# Patient Record
Sex: MALE | Race: WHITE | NOT HISPANIC OR LATINO | Employment: OTHER | URBAN - METROPOLITAN AREA
[De-identification: names, ages, dates, MRNs, and addresses within clinical notes are randomized per-mention and may not be internally consistent; named-entity substitution may affect disease eponyms.]

---

## 2021-05-12 ENCOUNTER — MEDICAL CORRESPONDENCE (OUTPATIENT)
Dept: HEALTH INFORMATION MANAGEMENT | Facility: CLINIC | Age: 71
End: 2021-05-12

## 2023-02-22 ENCOUNTER — TELEPHONE (OUTPATIENT)
Dept: OPHTHALMOLOGY | Facility: CLINIC | Age: 73
End: 2023-02-22
Payer: COMMERCIAL

## 2023-02-22 ENCOUNTER — TRANSCRIBE ORDERS (OUTPATIENT)
Dept: OTHER | Age: 73
End: 2023-02-22

## 2023-02-22 DIAGNOSIS — H35.711: Primary | ICD-10-CM

## 2023-02-22 NOTE — TELEPHONE ENCOUNTER
Note to facilitator to assist on working on visudyne approval prior to scheduling PDT and assisting with scheduling.    Nuno Chaves RN 4:43 PM 02/22/23            [facility] Call Center    Phone Message    May a detailed message be left on voicemail: yes     Reason for Call: Appointment Intake    Referring Provider Name: Referred by: Dr. Jl Jacob @ [facility]  Phone: [phone], Fax: [phone]  Diagnosis and/or Symptoms:   Retina Clinic consult and testing. Provisional dx CSCR Right eye, PDT testing, Central serous chorioretinopathy,       Action Taken: Message routed to:  [facility]    Travel Screening: Not Applicable

## 2023-02-27 DIAGNOSIS — H35.711 CENTRAL SEROUS CHORIORETINOPATHY OF RIGHT EYE: Primary | ICD-10-CM

## 2023-02-27 NOTE — CONFIDENTIAL NOTE
Attempted to place order for verteporfin (Visudyne) per referral from Dr. Jacob for PDT treatment. Unable to complete order as associating dx per referring MD is not coverable with pt's insurance and would require an ABN to be signed by the pt.    VICENTA Mcknight 4:56 PM February 27, 2023

## 2023-02-28 DIAGNOSIS — H35.711 CENTRAL SEROUS CHORIORETINOPATHY OF RIGHT EYE: Primary | ICD-10-CM

## 2023-03-16 ENCOUNTER — OFFICE VISIT (OUTPATIENT)
Dept: OPHTHALMOLOGY | Facility: CLINIC | Age: 73
End: 2023-03-16
Attending: OPHTHALMOLOGY
Payer: COMMERCIAL

## 2023-03-16 DIAGNOSIS — H35.711 CENTRAL SEROUS CHORIORETINOPATHY OF RIGHT EYE: ICD-10-CM

## 2023-03-16 DIAGNOSIS — H35.711: ICD-10-CM

## 2023-03-16 PROCEDURE — 99207 FUNDUS AUTOFLUORESCENCE IMAGE (FAF) OU (BOTH EYES): CPT | Mod: 26 | Performed by: OPHTHALMOLOGY

## 2023-03-16 PROCEDURE — 92134 CPTRZ OPH DX IMG PST SGM RTA: CPT | Performed by: OPHTHALMOLOGY

## 2023-03-16 PROCEDURE — 99204 OFFICE O/P NEW MOD 45 MIN: CPT | Performed by: OPHTHALMOLOGY

## 2023-03-16 PROCEDURE — 92242 FLUORESCEIN&ICG ANGIOGRAPHY: CPT | Performed by: OPHTHALMOLOGY

## 2023-03-16 PROCEDURE — G0463 HOSPITAL OUTPT CLINIC VISIT: HCPCS | Mod: 25 | Performed by: OPHTHALMOLOGY

## 2023-03-16 PROCEDURE — 92250 FUNDUS PHOTOGRAPHY W/I&R: CPT | Performed by: OPHTHALMOLOGY

## 2023-03-16 RX ORDER — EPLERENONE 25 MG/1
25 TABLET, FILM COATED ORAL DAILY
Qty: 30 TABLET | Refills: 1 | Status: SHIPPED | OUTPATIENT
Start: 2023-03-16 | End: 2023-05-07

## 2023-03-16 ASSESSMENT — TONOMETRY
IOP_METHOD: TONOPEN
OS_IOP_MMHG: 16
OD_IOP_MMHG: 13

## 2023-03-16 ASSESSMENT — CONF VISUAL FIELD
OD_SUPERIOR_NASAL_RESTRICTION: 0
OD_NORMAL: 1
OD_SUPERIOR_TEMPORAL_RESTRICTION: 0
OS_NORMAL: 1
OS_SUPERIOR_NASAL_RESTRICTION: 0
OS_SUPERIOR_TEMPORAL_RESTRICTION: 0
OD_INFERIOR_TEMPORAL_RESTRICTION: 0
OD_INFERIOR_NASAL_RESTRICTION: 0
OS_INFERIOR_TEMPORAL_RESTRICTION: 0
OS_INFERIOR_NASAL_RESTRICTION: 0

## 2023-03-16 ASSESSMENT — EXTERNAL EXAM - LEFT EYE: OS_EXAM: NORMAL

## 2023-03-16 ASSESSMENT — EXTERNAL EXAM - RIGHT EYE: OD_EXAM: NORMAL

## 2023-03-16 ASSESSMENT — VISUAL ACUITY
OS_SC+: +2
OS_SC: 20/25
OD_SC: 20/40
METHOD: SNELLEN - LINEAR

## 2023-03-16 ASSESSMENT — SLIT LAMP EXAM - LIDS
COMMENTS: NORMAL
COMMENTS: NORMAL

## 2023-03-16 NOTE — LETTER
3/16/2023       RE: Tino Hunt  [address]  [place] [place] [facility] [address]     Dear Colleague,    Thank you for referring your patient, Tino Hunt, to the [facility] - [place] at [facility]. Please see a copy of my visit note below.    I have confirmed the patient's and reviewed Past Medical History, Past Surgical History, Social History, Family History, Problem List, Medication List and agree with Tech note.    CC: referal for Central serous retinopathy right eye     HPI: patient has had Central serous retinopathy for 6 months and no resolution with observation    Imaging:  Right eye- subretinal fluid with no intraretinal fluid; PVD  Left eye- normal contour, trace epiretinal membrane with no intraretinal fluid or subretinal fluid    Fluorescein angiography/ICG   Right eye transit-staining with staining at fovea, no choroidal neovascularization or polyps noted on 12 min late frames   Left eye no leakage with either FA or ICG    Assessment/plan:   1.  Central serous retinopathy right eye    Fluorescein angiography/icg transits right eye  To rule out polypoidal choroidal vasculopathy shows miled staining, no choroidal neovascularization or polyps    OCT and fundus autofluorescence both eyes    Plan: start inspira 25 mg daily       RTC 8 weeks for Exam/OCT to monitor effectivenes of inspira    Mazin Underwood MD PhD.  Professor & Chair

## 2023-03-16 NOTE — PROGRESS NOTES
I have confirmed the patient's and reviewed Past Medical History, Past Surgical History, Social History, Family History, Problem List, Medication List and agree with Tech note.    CC: referal for Central serous retinopathy right eye     HPI: patient has had Central serous retinopathy for 6 months and no resolution with observation    Imaging:  Right eye- subretinal fluid with no intraretinal fluid; PVD  Left eye- normal contour, trace epiretinal membrane with no intraretinal fluid or subretinal fluid    Fluorescein angiography/ICG   Right eye transit-staining with staining at fovea, no choroidal neovascularization or polyps noted on 12 min late frames   Left eye no leakage with either FA or ICG    Assessment/plan:   1.  Central serous retinopathy right eye    Fluorescein angiography/icg transits right eye  To rule out polypoidal choroidal vasculopathy shows miled staining, no choroidal neovascularization or polyps    OCT and fundus autofluorescence both eyes    Plan: start inspira 25 mg daily       RTC 8 weeks for Exam/OCT to monitor effectivenes of inspira    Mazin Underwood MD PhD.  Professor & Chair

## 2023-03-16 NOTE — NURSING NOTE
Chief Complaints and History of Present Illnesses   Patient presents with     Central Serous Retinopathy Evaluation     Referred by Omar Dajani/[facility] RE haze spot in vision x 6 months  Kristyn YADAV 8:48 AM March 16, 2023        Chief Complaint(s) and History of Present Illness(es)     Central Serous Retinopathy Evaluation            Laterality: right eye    Location: central vision    Quality: hazy    Associated symptoms: floaters (LE).  Negative for eye pain, photophobia and flashes    Treatments tried: no treatments    Pain scale: 0/10    Comments: Referred by Omar Dajani/[facility] RE haze spot in vision x 6 months  Kristyn YADAV 8:48 AM March 16, 2023

## 2023-04-08 DIAGNOSIS — H35.711 CENTRAL SEROUS CHORIORETINOPATHY OF RIGHT EYE: ICD-10-CM

## 2023-04-12 RX ORDER — EPLERENONE 25 MG/1
TABLET, FILM COATED ORAL
Qty: 30 TABLET | Refills: 1 | OUTPATIENT
Start: 2023-04-12

## 2023-04-26 DIAGNOSIS — H35.711 CENTRAL SEROUS CHORIORETINOPATHY OF RIGHT EYE: Primary | ICD-10-CM

## 2023-05-05 DIAGNOSIS — H35.711 CENTRAL SEROUS CHORIORETINOPATHY OF RIGHT EYE: ICD-10-CM

## 2023-05-06 NOTE — TELEPHONE ENCOUNTER
eplerenone (INSPRA) 25 MG tablet   Last Written Prescription Date: 3/16/23  Last Fill Quantity: 30,   # refills: 1  Last Office Visit : 3/16/23  Future Office visit:  5/11/23    Routing refill request to provider for review/approval because: end date 4/15/23. Not on protocol

## 2023-05-07 RX ORDER — EPLERENONE 25 MG/1
TABLET, FILM COATED ORAL
Qty: 30 TABLET | Refills: 1 | Status: SHIPPED | OUTPATIENT
Start: 2023-05-07 | End: 2023-05-11

## 2023-05-11 ENCOUNTER — OFFICE VISIT (OUTPATIENT)
Dept: OPHTHALMOLOGY | Facility: CLINIC | Age: 73
End: 2023-05-11
Attending: OPHTHALMOLOGY
Payer: COMMERCIAL

## 2023-05-11 DIAGNOSIS — H35.711 CENTRAL SEROUS CHORIORETINOPATHY OF RIGHT EYE: ICD-10-CM

## 2023-05-11 PROCEDURE — 99213 OFFICE O/P EST LOW 20 MIN: CPT | Performed by: OPHTHALMOLOGY

## 2023-05-11 PROCEDURE — 92134 CPTRZ OPH DX IMG PST SGM RTA: CPT | Performed by: OPHTHALMOLOGY

## 2023-05-11 PROCEDURE — G0463 HOSPITAL OUTPT CLINIC VISIT: HCPCS | Performed by: OPHTHALMOLOGY

## 2023-05-11 RX ORDER — LOSARTAN POTASSIUM 25 MG/1
TABLET ORAL
COMMUNITY
Start: 2022-09-28 | End: 2023-07-17

## 2023-05-11 RX ORDER — NAPROXEN 500 MG/1
TABLET ORAL
COMMUNITY
Start: 2022-07-19 | End: 2023-07-17

## 2023-05-11 RX ORDER — BROMFENAC SODIUM 0.7 MG/ML
SOLUTION/ DROPS OPHTHALMIC
COMMUNITY
Start: 2022-08-17 | End: 2023-07-17

## 2023-05-11 RX ORDER — LORAZEPAM 1 MG/1
1 TABLET ORAL
COMMUNITY
Start: 2009-03-03

## 2023-05-11 RX ORDER — TAMSULOSIN HYDROCHLORIDE 0.4 MG/1
0.4 CAPSULE ORAL DAILY
COMMUNITY
Start: 2022-10-03 | End: 2023-11-08

## 2023-05-11 RX ORDER — EPLERENONE 25 MG/1
25 TABLET, FILM COATED ORAL DAILY
Qty: 30 TABLET | Refills: 3 | Status: SHIPPED | OUTPATIENT
Start: 2023-05-11 | End: 2023-07-17

## 2023-05-11 RX ORDER — AMLODIPINE BESYLATE 5 MG/1
1 TABLET ORAL
COMMUNITY
Start: 2023-01-27

## 2023-05-11 ASSESSMENT — CONF VISUAL FIELD
OD_SUPERIOR_NASAL_RESTRICTION: 0
OS_SUPERIOR_NASAL_RESTRICTION: 0
OD_NORMAL: 1
OS_INFERIOR_TEMPORAL_RESTRICTION: 0
OD_INFERIOR_TEMPORAL_RESTRICTION: 0
OD_INFERIOR_NASAL_RESTRICTION: 0
OS_INFERIOR_NASAL_RESTRICTION: 0
OS_SUPERIOR_TEMPORAL_RESTRICTION: 0
OS_NORMAL: 1
OD_SUPERIOR_TEMPORAL_RESTRICTION: 0

## 2023-05-11 ASSESSMENT — EXTERNAL EXAM - RIGHT EYE: OD_EXAM: NORMAL

## 2023-05-11 ASSESSMENT — VISUAL ACUITY
METHOD: SNELLEN - LINEAR
OS_SC: 20/25
OD_SC: 20/40

## 2023-05-11 ASSESSMENT — EXTERNAL EXAM - LEFT EYE: OS_EXAM: NORMAL

## 2023-05-11 ASSESSMENT — SLIT LAMP EXAM - LIDS
COMMENTS: NORMAL
COMMENTS: NORMAL

## 2023-05-11 ASSESSMENT — TONOMETRY
IOP_METHOD: TONOPEN
OS_IOP_MMHG: 17
OD_IOP_MMHG: 20

## 2023-05-11 NOTE — PROGRESS NOTES
I have confirmed the patient's and reviewed Past Medical History, Past Surgical History, Social History, Family History, Problem List, Medication List and agree with Tech note.    CC: referal for Central serous retinopathy right eye     HPI: patient has had Central serous retinopathy for 6 months and no resolution with observation and now on eplenerone for 2 months and subjectively the same.    Imaging:  Right eye- reduced subretinal fluid with no intraretinal fluid; PVD  Left eye- normal contour, trace epiretinal membrane with no intraretinal fluid or subretinal fluid    Fluorescein angiography/ICG   Right eye transit-staining with staining at fovea, no choroidal neovascularization or polyps noted on 12 min late frames   Left eye no leakage with either FA or ICG    Assessment/plan:   1.  Central serous retinopathy right eye    Fluorescein angiography/icg transits right eye  To rule out polypoidal choroidal vasculopathy shows miled staining, no choroidal neovascularization or polyps    OCT and fundus autofluorescence both eyes    Eat banana daily to avert low potassium.   K+ level with primary care physician    Plan: continue inspira 25 mg daily       RTC 10 weeks for Exam/OCT to monitor effectivenes of inspira    Mazin Underwood MD PhD.  Professor & Chair

## 2023-05-11 NOTE — NURSING NOTE
Chief Complaints and History of Present Illnesses   Patient presents with     Central Serous Retinopathy Follow Up     Chief Complaint(s) and History of Present Illness(es)     Central Serous Retinopathy Follow Up            Laterality: right eye    Onset: 8 weeks ago          Comments    Pt. States that VA has been stable. Not any better or worse. Has not missed any doses of medication. No pain BE.   Vijaya Tyson COT 7:45 AM May 11, 2023

## 2023-05-11 NOTE — LETTER
5/11/2023       RE: Tino Hunt  [address]  [place] [address]     Dear Colleague,    Thank you for referring your patient, Tino Hunt, to the [facility] - [place] at [facility]. Please see a copy of my visit note below.    I have confirmed the patient's and reviewed Past Medical History, Past Surgical History, Social History, Family History, Problem List, Medication List and agree with Tech note.    CC: referal for Central serous retinopathy right eye     HPI: patient has had Central serous retinopathy for 6 months and no resolution with observation and now on eplenerone for 2 months and subjectively the same.    Imaging:  Right eye- reduced subretinal fluid with no intraretinal fluid; PVD  Left eye- normal contour, trace epiretinal membrane with no intraretinal fluid or subretinal fluid    Fluorescein angiography/ICG   Right eye transit-staining with staining at fovea, no choroidal neovascularization or polyps noted on 12 min late frames   Left eye no leakage with either FA or ICG    Assessment/plan:   1.  Central serous retinopathy right eye    Fluorescein angiography/icg transits right eye  To rule out polypoidal choroidal vasculopathy shows miled staining, no choroidal neovascularization or polyps    OCT and fundus autofluorescence both eyes    Eat banana daily to avert low potassium.   K+ level with primary care physician    Plan: continue inspira 25 mg daily       RTC 10 weeks for Exam/OCT to monitor effectivenes of inspira    Mazin Quarles MD PhD.  Professor & Chair        Again, thank you for allowing me to participate in the care of your patient.      Sincerely,    Mazin Quarles MD

## 2023-05-21 ENCOUNTER — HEALTH MAINTENANCE LETTER (OUTPATIENT)
Age: 73
End: 2023-05-21

## 2023-07-06 DIAGNOSIS — H35.711 CENTRAL SEROUS CHORIORETINOPATHY OF RIGHT EYE: Primary | ICD-10-CM

## 2023-07-17 ENCOUNTER — MYC MEDICAL ADVICE (OUTPATIENT)
Dept: OPHTHALMOLOGY | Facility: CLINIC | Age: 73
End: 2023-07-17

## 2023-07-17 ENCOUNTER — OFFICE VISIT (OUTPATIENT)
Dept: OPHTHALMOLOGY | Facility: CLINIC | Age: 73
End: 2023-07-17
Attending: OPHTHALMOLOGY
Payer: COMMERCIAL

## 2023-07-17 DIAGNOSIS — H35.711 CENTRAL SEROUS CHORIORETINOPATHY OF RIGHT EYE: ICD-10-CM

## 2023-07-17 PROCEDURE — 99213 OFFICE O/P EST LOW 20 MIN: CPT | Mod: GC | Performed by: OPHTHALMOLOGY

## 2023-07-17 PROCEDURE — 92134 CPTRZ OPH DX IMG PST SGM RTA: CPT | Performed by: OPHTHALMOLOGY

## 2023-07-17 PROCEDURE — G0463 HOSPITAL OUTPT CLINIC VISIT: HCPCS | Performed by: OPHTHALMOLOGY

## 2023-07-17 RX ORDER — EPLERENONE 25 MG/1
25 TABLET, FILM COATED ORAL DAILY
Qty: 30 TABLET | Refills: 3 | Status: SHIPPED | OUTPATIENT
Start: 2023-07-17 | End: 2023-10-09

## 2023-07-17 ASSESSMENT — VISUAL ACUITY
OS_SC+: -1
METHOD: SNELLEN - LINEAR
OD_SC: 20/30
OD_SC+: +2
OS_SC: 20/25

## 2023-07-17 ASSESSMENT — TONOMETRY
IOP_METHOD: TONOPEN
OD_IOP_MMHG: 13
OS_IOP_MMHG: 13

## 2023-07-17 ASSESSMENT — CONF VISUAL FIELD
OD_SUPERIOR_NASAL_RESTRICTION: 0
OD_SUPERIOR_TEMPORAL_RESTRICTION: 0
OD_INFERIOR_NASAL_RESTRICTION: 0
OS_INFERIOR_TEMPORAL_RESTRICTION: 0
OS_SUPERIOR_NASAL_RESTRICTION: 0
OS_NORMAL: 1
OD_INFERIOR_TEMPORAL_RESTRICTION: 0
METHOD: COUNTING FINGERS
OD_NORMAL: 1
OS_SUPERIOR_TEMPORAL_RESTRICTION: 0
OS_INFERIOR_NASAL_RESTRICTION: 0

## 2023-07-17 ASSESSMENT — EXTERNAL EXAM - RIGHT EYE: OD_EXAM: NORMAL

## 2023-07-17 ASSESSMENT — EXTERNAL EXAM - LEFT EYE: OS_EXAM: NORMAL

## 2023-07-17 ASSESSMENT — SLIT LAMP EXAM - LIDS
COMMENTS: NORMAL
COMMENTS: NORMAL

## 2023-07-17 NOTE — PROGRESS NOTES
I have confirmed the patient's and reviewed Past Medical History, Past Surgical History, Social History, Family History, Problem List, Medication List and agree with Tech note.    CC: referal for Central serous retinopathy right eye     HPI: patient has had Central serous retinopathy for 6 months and no resolution with observation and now on eplenerone for 4 months without improvement    Imaging:  OCT mac 07/17/23  Right eye- Increased subretinal fluid with no intraretinal fluid; PVD  Left eye- normal contour, trace epiretinal membrane with no intraretinal fluid or subretinal fluid    Fluorescein angiography/ICG   Right eye transit-staining with staining at fovea, no choroidal neovascularization or polyps noted on 12 min late frames   Left eye no leakage with either FA or ICG    Assessment/plan:   1.  Central serous retinopathy right eye    Fluorescein angiography/icg transits right eye  To rule out polypoidal choroidal vasculopathy shows miled staining, no choroidal neovascularization or polyps    OCT and fundus autofluorescence both eyes    K was 4.4 in June    Eat banana daily to avert low potassium.   Plan: stop inspira      Return in about 3 months (around 10/17/2023) for , Exam & OCT OU.      Thank you for entrusting us with your care  Mechelle To MD, PGY3  Ophthalmology Resident  [facility]    Attestation:  I have seen and examined the patient with Dr. Radha To MD and agree with the findings in this note, as well as the interpretations of the diagnostic tests.      Mazin Underwood MD PhD.  Professor & Chair

## 2023-07-17 NOTE — NURSING NOTE
Chief Complaints and History of Present Illnesses   Patient presents with     Follow Up     Central serous chorioretinopathy of right eye     Chief Complaint(s) and History of Present Illness(es)     Follow Up            Comments: Central serous chorioretinopathy of right eye          Comments    Pt states no change in VA since last visit  Pt states floaters in left eye for years  States no flashes, eye pain or redness    Monique Khan COT 12:34 PM July 17, 2023

## 2023-07-21 NOTE — TELEPHONE ENCOUNTER
Called and left VM to stop Eplerenone as was indicated in Dr. AGUILAR's last note. I told the patient to call with any questions.

## 2023-09-28 DIAGNOSIS — H35.711 CENTRAL SEROUS CHORIORETINOPATHY OF RIGHT EYE: Primary | ICD-10-CM

## 2023-10-09 ENCOUNTER — OFFICE VISIT (OUTPATIENT)
Dept: OPHTHALMOLOGY | Facility: CLINIC | Age: 73
End: 2023-10-09
Attending: OPHTHALMOLOGY
Payer: COMMERCIAL

## 2023-10-09 DIAGNOSIS — H35.711 CENTRAL SEROUS CHORIORETINOPATHY OF RIGHT EYE: ICD-10-CM

## 2023-10-09 PROCEDURE — 92134 CPTRZ OPH DX IMG PST SGM RTA: CPT | Performed by: OPHTHALMOLOGY

## 2023-10-09 PROCEDURE — 99213 OFFICE O/P EST LOW 20 MIN: CPT | Mod: GC | Performed by: OPHTHALMOLOGY

## 2023-10-09 PROCEDURE — G0463 HOSPITAL OUTPT CLINIC VISIT: HCPCS | Performed by: OPHTHALMOLOGY

## 2023-10-09 ASSESSMENT — VISUAL ACUITY
OD_SC+: -2
OS_SC+: -2
METHOD: SNELLEN - LINEAR
OD_SC: 20/40
OS_SC: 20/25

## 2023-10-09 ASSESSMENT — CONF VISUAL FIELD
OS_INFERIOR_TEMPORAL_RESTRICTION: 0
OS_NORMAL: 1
OD_SUPERIOR_TEMPORAL_RESTRICTION: 0
OD_INFERIOR_TEMPORAL_RESTRICTION: 0
OD_INFERIOR_NASAL_RESTRICTION: 0
OD_NORMAL: 1
METHOD: COUNTING FINGERS
OS_SUPERIOR_NASAL_RESTRICTION: 0
OS_SUPERIOR_TEMPORAL_RESTRICTION: 0
OS_INFERIOR_NASAL_RESTRICTION: 0
OD_SUPERIOR_NASAL_RESTRICTION: 0

## 2023-10-09 ASSESSMENT — EXTERNAL EXAM - LEFT EYE: OS_EXAM: NORMAL

## 2023-10-09 ASSESSMENT — EXTERNAL EXAM - RIGHT EYE: OD_EXAM: NORMAL

## 2023-10-09 ASSESSMENT — SLIT LAMP EXAM - LIDS
COMMENTS: NORMAL
COMMENTS: NORMAL

## 2023-10-09 ASSESSMENT — TONOMETRY
OD_IOP_MMHG: 20
OS_IOP_MMHG: 19
IOP_METHOD: TONOPEN

## 2023-10-09 NOTE — PROGRESS NOTES
I have confirmed the patient's and reviewed Past Medical History, Past Surgical History, Social History, Family History, Problem List, Medication List and agree with Tech note.    CC: referal for Central serous retinopathy right eye     Interval: Tino is here to continue care for central serous chorioretinopathy of RE. Today he states  no vision change from last visit.     HPI: patient has had Central serous retinopathy for 6 months and no resolution with observation and now on eplenerone for 4 months without improvement    Imaging:  OCT mac 07/17/23  Right eye- Increased subretinal fluid with no intraretinal fluid; PVD  Left eye- normal contour, trace epiretinal membrane with no intraretinal fluid or subretinal fluid    Fluorescein angiography/ICG   Right eye transit-staining with staining at fovea, no choroidal neovascularization or polyps noted on 12 min late frames   Left eye no leakage with either FA or ICG    Assessment/plan:   1.  Central serous retinopathy right eye    Fluorescein angiography/icg transits right eye  To rule out polypoidal choroidal vasculopathy shows miled staining, no choroidal neovascularization or polyps    OCT and fundus autofluorescence both eyes    K was 4.4 in June    Eat banana daily to avert low potassium.   Plan: Continue monitoring off inspira      Return in about 4 months (around 2/9/2024) for DFE OD only, OCT macula.      Abdifatah Payne MD MPH  Vitreoretinal Fellow PGY-6  [facility]     Attestation:  I have seen and examined the patient with Dr. Abdifatah Payne MD, MPH and agree with the findings in this note, as well as the interpretations of the diagnostic tests.      Mazin Underwood MD PhD.  Professor & Chair

## 2023-10-09 NOTE — NURSING NOTE
Chief Complaints and History of Present Illnesses   Patient presents with    Central Serous Retinopathy Follow Up     Chief Complaint(s) and History of Present Illness(es)       Central Serous Retinopathy Follow Up              Laterality: right eye    Associated symptoms: floaters (LE).  Negative for dryness, eye pain, tearing, flashes, itching and burning    Pain scale: 0/10              Comments    Tino is here to continue care for central serous chorioretinopathy of RE. Today he states  no vision change from last visit.     Tino Carrillo COT 1:44 PM October 9, 2023

## 2023-12-31 ENCOUNTER — HEALTH MAINTENANCE LETTER (OUTPATIENT)
Age: 73
End: 2023-12-31

## 2024-01-10 DIAGNOSIS — H35.711 CENTRAL SEROUS CHORIORETINOPATHY OF RIGHT EYE: Primary | ICD-10-CM

## 2024-01-18 ENCOUNTER — OFFICE VISIT (OUTPATIENT)
Dept: OPHTHALMOLOGY | Facility: CLINIC | Age: 74
End: 2024-01-18
Attending: OPHTHALMOLOGY
Payer: COMMERCIAL

## 2024-01-18 DIAGNOSIS — H35.711 CENTRAL SEROUS CHORIORETINOPATHY OF RIGHT EYE: ICD-10-CM

## 2024-01-18 PROCEDURE — 92134 CPTRZ OPH DX IMG PST SGM RTA: CPT | Performed by: OPHTHALMOLOGY

## 2024-01-18 PROCEDURE — G0463 HOSPITAL OUTPT CLINIC VISIT: HCPCS | Performed by: OPHTHALMOLOGY

## 2024-01-18 PROCEDURE — 99214 OFFICE O/P EST MOD 30 MIN: CPT | Mod: GC | Performed by: OPHTHALMOLOGY

## 2024-01-18 ASSESSMENT — VISUAL ACUITY
OD_SC+: -1
OD_SC: 20/60
OD_PH_SC+: -2
OD_PH_SC: 20/40
OS_SC: 20/20
OS_SC+: -3
METHOD: SNELLEN - LINEAR

## 2024-01-18 ASSESSMENT — CONF VISUAL FIELD
OS_SUPERIOR_NASAL_RESTRICTION: 0
OS_INFERIOR_NASAL_RESTRICTION: 0
OD_INFERIOR_TEMPORAL_RESTRICTION: 0
OD_SUPERIOR_NASAL_RESTRICTION: 0
OD_INFERIOR_NASAL_RESTRICTION: 0
OD_SUPERIOR_TEMPORAL_RESTRICTION: 0
OS_INFERIOR_TEMPORAL_RESTRICTION: 0
OS_SUPERIOR_TEMPORAL_RESTRICTION: 0

## 2024-01-18 ASSESSMENT — SLIT LAMP EXAM - LIDS
COMMENTS: NORMAL
COMMENTS: NORMAL

## 2024-01-18 ASSESSMENT — TONOMETRY
OS_IOP_MMHG: 18
IOP_METHOD: TONOPEN
OD_IOP_MMHG: 15

## 2024-01-18 ASSESSMENT — EXTERNAL EXAM - LEFT EYE: OS_EXAM: NORMAL

## 2024-01-18 ASSESSMENT — EXTERNAL EXAM - RIGHT EYE: OD_EXAM: NORMAL

## 2024-01-18 NOTE — PROGRESS NOTES
I have confirmed the patient's and reviewed Past Medical History, Past Surgical History, Social History, Family History, Problem List, Medication List and agree with Tech note.    CC: follow up Central serous retinopathy right eye     Interval: Tino is here to continue care for central serous chorioretinopathy of RE. Today he states his vision and distortion have gotten significantly worse since his last visit.    HPI: patient has had Central serous retinopathy for 6 months and no resolution with observation and now on eplenerone for 4 months without improvement    Imaging:  OCT mac 1/18/24  Right eye- Improved subretinal fluid over fovea, some remaining SRF superiorly, no intraretinal fluid; PVD  Left eye- normal contour, trace epiretinal membrane with no intraretinal fluid or subretinal fluid    Fluorescein angiography/ICG   Right eye transit-staining with staining at fovea, no choroidal neovascularization or polyps noted on 12 min late frames   Left eye no leakage with either FA or ICG    Assessment/plan:   1.  Central serous retinopathy right eye    Fluorescein angiography/icg transits right eye  To rule out polypoidal choroidal vasculopathy shows miled staining, no choroidal neovascularization or polyps   K was 4.4 in June    Eat banana daily to avert low potassium.  Previously tried eplerenone with minimal improvement     OCT with significantly improved SRF today 1/18/24   Subjectively worse distortion despite improvement in fluid   Continue to monitor  2. Pseudophakia Both eyes    stable    Return in about 3 months (around 4/18/2024) for , Exam & OCT OU, FAF OU.      Elvia Sarmiento MD  Resident Physician, PGY-3  Department of Ophthalmology      Attestation:  I have seen and examined the patient with Dr. Sarmiento and agree with the findings in this note, as well as the interpretations of the diagnostic tests.      Mazin Underwood MD PhD.  Professor & Chair

## 2024-01-18 NOTE — NURSING NOTE
Chief Complaints and History of Present Illnesses   Patient presents with    Decreased Vision Right Eye     Chief Complaint(s) and History of Present Illness(es)       Decreased Vision Right Eye              Laterality: right eye    Associated symptoms: floaters.  Negative for eye pain and flashes    Treatments tried: artificial tears              Comments    Here for decreased vision right eye. VA in the eye is about the same. Stable floaters without flashes. Uses AT as needed.    Blane JOHNSON 9:08 AM January 18, 2024

## 2024-03-27 NOTE — PROGRESS NOTES
CC: central serous chorioretinopathy right eye     HPI: Mr Hunt is seen today for follow up on central serous chorioretinopathy right eye.     PMHx:   NA      Imaging:    OCT: 03/28/2024  Right eye: improved SRF superiorly, still persistent inferiorly  Left eye: Good foveal contour, no IRF/SRF     Retina Laser procedures:  none    Intravitreal injections:  none    Assessment/ Plan: 03/28/2024       # Central serous retinopathy right eye   -Fluorescein angiography/icg transits right eye done previously to rule out polypoidal choroidal vasculopathy shows mild staining, no choroidal neovascularization or polyps   - Previously tried eplerenone with minimal improvement  - OCT with significantly improved SRF today 1/18/24  - Subjectively worse distortion despite improvement in fluid  - Continue to monitor  - follow up in 4 months    # Optic disc cupping both eyes   - baseline RNFL     # Pseudophakia Both eyes   -stable     Return in about 4 months for , Exam & OCT OU, FAF OU.      Steph You MD     Medical Retina  [facility]     Attending Physician Attestation:  Complete documentation of historical and exam elements from today's encounter can be found in the full encounter summary report (not reduplicated in this progress note).  I personally obtained the chief complaint(s) and history of present illness.  I confirmed and edited as necessary the review of systems, past medical/surgical history, family history, social history, and examination findings as documented by others; and I examined the patient myself.  I personally reviewed the relevant tests, images, and reports as documented above.  I formulated and edited as necessary the assessment and plan and discussed the findings and management plan with the patient and family. Steph You MD

## 2024-03-28 ENCOUNTER — OFFICE VISIT (OUTPATIENT)
Dept: OPHTHALMOLOGY | Facility: CLINIC | Age: 74
End: 2024-03-28
Payer: COMMERCIAL

## 2024-03-28 DIAGNOSIS — H35.711 CENTRAL SEROUS CHORIORETINOPATHY OF RIGHT EYE: Primary | ICD-10-CM

## 2024-03-28 PROCEDURE — 99213 OFFICE O/P EST LOW 20 MIN: CPT

## 2024-03-28 PROCEDURE — G0463 HOSPITAL OUTPT CLINIC VISIT: HCPCS

## 2024-03-28 PROCEDURE — 92134 CPTRZ OPH DX IMG PST SGM RTA: CPT

## 2024-03-28 ASSESSMENT — TONOMETRY
OS_IOP_MMHG: 15
IOP_METHOD: TONOPEN
OD_IOP_MMHG: 12

## 2024-03-28 ASSESSMENT — VISUAL ACUITY
METHOD: SNELLEN - LINEAR
OD_SC: 20/60
OS_SC+: -1
OD_SC+: -2
OS_SC: 20/20
OD_PH_SC+: +2
OD_PH_SC: 20/50

## 2024-03-28 ASSESSMENT — SLIT LAMP EXAM - LIDS
COMMENTS: NORMAL
COMMENTS: NORMAL

## 2024-03-28 ASSESSMENT — EXTERNAL EXAM - LEFT EYE: OS_EXAM: NORMAL

## 2024-03-28 ASSESSMENT — EXTERNAL EXAM - RIGHT EYE: OD_EXAM: NORMAL

## 2024-03-28 NOTE — NURSING NOTE
Chief Complaints and History of Present Illnesses   Patient presents with    Follow Up     Central serous chorioretinopathy of right eye     Chief Complaint(s) and History of Present Illness(es)       Follow Up              Comments: Central serous chorioretinopathy of right eye              Comments    Pt states no change in VA since last visit  Floaters same as last visit   No flashes, eye pain or redness    Monique Khan COT 8:12 AM March 28, 2024

## 2024-06-17 DIAGNOSIS — H35.711 CENTRAL SEROUS CHORIORETINOPATHY OF RIGHT EYE: Primary | ICD-10-CM

## 2024-06-27 ENCOUNTER — OFFICE VISIT (OUTPATIENT)
Dept: OPHTHALMOLOGY | Facility: CLINIC | Age: 74
End: 2024-06-27
Payer: COMMERCIAL

## 2024-06-27 DIAGNOSIS — H35.711 CENTRAL SEROUS CHORIORETINOPATHY OF RIGHT EYE: ICD-10-CM

## 2024-06-27 PROCEDURE — 92134 CPTRZ OPH DX IMG PST SGM RTA: CPT

## 2024-06-27 PROCEDURE — 99207 FUNDUS AUTOFLUORESCENCE IMAGE (FAF) OU (BOTH EYES): CPT | Mod: 26

## 2024-06-27 PROCEDURE — G0463 HOSPITAL OUTPT CLINIC VISIT: HCPCS

## 2024-06-27 PROCEDURE — 99214 OFFICE O/P EST MOD 30 MIN: CPT

## 2024-06-27 PROCEDURE — 92250 FUNDUS PHOTOGRAPHY W/I&R: CPT

## 2024-06-27 RX ORDER — EPLERENONE 25 MG/1
25 TABLET, FILM COATED ORAL DAILY
Qty: 30 TABLET | Refills: 1 | Status: SHIPPED | OUTPATIENT
Start: 2024-06-27 | End: 2024-08-22

## 2024-06-27 ASSESSMENT — VISUAL ACUITY
METHOD: SNELLEN - LINEAR
OD_SC+: -2
OD_PH_SC: 20/40
OS_SC+: -3
OS_SC: 20/25
OD_SC: 20/50
OD_PH_SC+: -1

## 2024-06-27 ASSESSMENT — CONF VISUAL FIELD
OS_SUPERIOR_TEMPORAL_RESTRICTION: 0
METHOD: COUNTING FINGERS
OD_SUPERIOR_TEMPORAL_RESTRICTION: 0
OD_NORMAL: 1
OS_SUPERIOR_NASAL_RESTRICTION: 0
OD_INFERIOR_TEMPORAL_RESTRICTION: 0
OD_INFERIOR_NASAL_RESTRICTION: 0
OS_INFERIOR_TEMPORAL_RESTRICTION: 0
OD_SUPERIOR_NASAL_RESTRICTION: 0
OS_INFERIOR_NASAL_RESTRICTION: 0
OS_NORMAL: 1

## 2024-06-27 ASSESSMENT — SLIT LAMP EXAM - LIDS
COMMENTS: NORMAL
COMMENTS: NORMAL

## 2024-06-27 ASSESSMENT — EXTERNAL EXAM - RIGHT EYE: OD_EXAM: NORMAL

## 2024-06-27 ASSESSMENT — TONOMETRY
OS_IOP_MMHG: 18
IOP_METHOD: TONOPEN
OD_IOP_MMHG: 21

## 2024-06-27 ASSESSMENT — EXTERNAL EXAM - LEFT EYE: OS_EXAM: NORMAL

## 2024-06-27 NOTE — PROGRESS NOTES
CC: central serous chorioretinopathy right eye     HPI: Mr Hunt is seen today for follow up on central serous chorioretinopathy right eye.     PMHx:   NA      Imaging:    OCT: 06/27/2024  Right eye: worsened SRF superiorly, still persistent inferiorly  Left eye: Good foveal contour, no IRF/SRF     Retina Laser procedures:  none    Intravitreal injections:  none    Assessment/ Plan: 06/27/2024       # Central serous retinopathy right eye   -Fluorescein angiography/icg transits right eye done previously to rule out polypoidal choroidal vasculopathy shows mild staining, no choroidal neovascularization or polyps   - Previously tried eplerenone with minimal improvement  - Subjectively the same, recurred IRF on OCT   - restart eplernone 25 mg orally daily   - follow up in 4 months with repeat OCT OU     # Optic disc cupping both eyes   - baseline RNFL     # Pseudophakia Both eyes   -stable     Return in about 4 month for , Exam & OCT OU, FAF OU.      Steph You MD     Medical Retina  [facility]     Attending Physician Attestation:  Complete documentation of historical and exam elements from today's encounter can be found in the full encounter summary report (not reduplicated in this progress note).  I personally obtained the chief complaint(s) and history of present illness.  I confirmed and edited as necessary the review of systems, past medical/surgical history, family history, social history, and examination findings as documented by others; and I examined the patient myself.  I personally reviewed the relevant tests, images, and reports as documented above.  I formulated and edited as necessary the assessment and plan and discussed the findings and management plan with the patient and family. Steph You MD

## 2024-06-27 NOTE — NURSING NOTE
Chief Complaints and History of Present Illnesses   Patient presents with    Central Serous Retinopathy Follow Up     Chief Complaint(s) and History of Present Illness(es)       Central Serous Retinopathy Follow Up              Laterality: right eye    Associated symptoms: floaters (left).  Negative for dryness, eye pain, tearing, flashes, itching and burning    Pain scale: 0/10              Comments    Tino is here to continue care for central serous chorioretinopathy of right eye. He feels vision is about the same as last visit. He sees better outside with sunglasses on.    Tino Gerardo COT 12:16 PM June 27, 2024

## 2024-06-28 ENCOUNTER — TELEPHONE (OUTPATIENT)
Dept: OPHTHALMOLOGY | Facility: CLINIC | Age: 74
End: 2024-06-28
Payer: COMMERCIAL

## 2024-06-28 NOTE — TELEPHONE ENCOUNTER
[facility] Call Center    Phone Message    May a detailed message be left on voicemail: yes     Reason for Call: Form or Letter   Type or form/letter needing completion: Lab orders   Provider: Dr. Wilfred You   Date form needed: ASAP   Once completed: Fax form to: [facility] Rainey [phone]    Other: Patient states Dr. Wilfred You was going to place lab orders but patient is trying to get those completed at his local clinic.       Action Taken: Message routed to:  [facility]     Travel Screening: Not Applicable     Date of Service:

## 2024-06-28 NOTE — TELEPHONE ENCOUNTER
Lab for Creatinine and potassium faxed per request with request to fax result to [phone] attn: Dr. Wilfred You/Lisha Chaves RN 9:49 AM 06/28/24

## 2024-08-22 ENCOUNTER — MYC MEDICAL ADVICE (OUTPATIENT)
Dept: OPHTHALMOLOGY | Facility: CLINIC | Age: 74
End: 2024-08-22
Payer: COMMERCIAL

## 2024-08-22 DIAGNOSIS — H35.711 CENTRAL SEROUS CHORIORETINOPATHY OF RIGHT EYE: ICD-10-CM

## 2024-08-22 RX ORDER — EPLERENONE 25 MG/1
25 TABLET, FILM COATED ORAL DAILY
Qty: 30 TABLET | Refills: 1 | Status: SHIPPED | OUTPATIENT
Start: 2024-08-22

## 2024-10-22 DIAGNOSIS — H35.711 CENTRAL SEROUS CHORIORETINOPATHY OF RIGHT EYE: Primary | ICD-10-CM

## 2024-11-04 ENCOUNTER — OFFICE VISIT (OUTPATIENT)
Dept: OPHTHALMOLOGY | Facility: CLINIC | Age: 74
End: 2024-11-04
Payer: COMMERCIAL

## 2024-11-04 DIAGNOSIS — H35.711 CENTRAL SEROUS CHORIORETINOPATHY OF RIGHT EYE: ICD-10-CM

## 2024-11-04 PROCEDURE — 99207 FUNDUS AUTOFLUORESCENCE IMAGE (FAF) OU (BOTH EYES): CPT | Mod: 26

## 2024-11-04 PROCEDURE — 92134 CPTRZ OPH DX IMG PST SGM RTA: CPT

## 2024-11-04 PROCEDURE — 92250 FUNDUS PHOTOGRAPHY W/I&R: CPT

## 2024-11-04 PROCEDURE — G0463 HOSPITAL OUTPT CLINIC VISIT: HCPCS

## 2024-11-04 PROCEDURE — 99214 OFFICE O/P EST MOD 30 MIN: CPT

## 2024-11-04 RX ORDER — EPLERENONE 25 MG/1
25 TABLET, FILM COATED ORAL DAILY
Qty: 90 TABLET | Refills: 3 | Status: SHIPPED | OUTPATIENT
Start: 2024-11-04

## 2024-11-04 ASSESSMENT — VISUAL ACUITY
OD_SC: 20/50
OS_SC: 20/20
OS_SC+: -2
METHOD: SNELLEN - LINEAR
OD_SC+: +2

## 2024-11-04 ASSESSMENT — SLIT LAMP EXAM - LIDS
COMMENTS: NORMAL
COMMENTS: NORMAL

## 2024-11-04 ASSESSMENT — CONF VISUAL FIELD
OD_INFERIOR_NASAL_RESTRICTION: 0
OS_SUPERIOR_NASAL_RESTRICTION: 0
OS_NORMAL: 1
METHOD: COUNTING FINGERS
OS_SUPERIOR_TEMPORAL_RESTRICTION: 0
OS_INFERIOR_NASAL_RESTRICTION: 0
OD_SUPERIOR_NASAL_RESTRICTION: 0
OD_NORMAL: 1
OD_INFERIOR_TEMPORAL_RESTRICTION: 0
OD_SUPERIOR_TEMPORAL_RESTRICTION: 0
OS_INFERIOR_TEMPORAL_RESTRICTION: 0

## 2024-11-04 ASSESSMENT — EXTERNAL EXAM - RIGHT EYE: OD_EXAM: NORMAL

## 2024-11-04 ASSESSMENT — TONOMETRY
OD_IOP_MMHG: 18
OS_IOP_MMHG: 18
IOP_METHOD: TONOPEN

## 2024-11-04 ASSESSMENT — EXTERNAL EXAM - LEFT EYE: OS_EXAM: NORMAL

## 2024-11-04 NOTE — PROGRESS NOTES
CC: central serous chorioretinopathy right eye     HPI: Mr Hunt is seen today for follow up on central serous chorioretinopathy right eye.     PMHx:   NA      Imaging:    OCT: 11/04/2024  Right eye: improved SRF superiorly, still persistent inferiorly  Left eye: Good foveal contour, no IRF/SRF     Retina Laser procedures:  none    Intravitreal injections:  none    Assessment/ Plan: 11/04/2024       # Central serous retinopathy right eye   -Fluorescein angiography/icg transits right eye done previously to rule out polypoidal choroidal vasculopathy shows mild staining, no choroidal neovascularization or polyps   - Previously tried eplerenone with minimal improvement  - Subjectively the same, improved  IRF on OCT with the use of eplernone 25 mg orally (started 4 months ago)  - continue same dose, follow-up in 3-4 months with repeat OCT macula OU     # Optic disc cupping both eyes   - baseline RNFL next visit    # Pseudophakia Both eyes   -stable     Return in about 3 month for , Exam & OCT macula and RNFL OU please try manifest refraction before dilation      Steph You MD     Medical Retina  [facility]     Attending Physician Attestation:  Complete documentation of historical and exam elements from today's encounter can be found in the full encounter summary report (not reduplicated in this progress note).  I personally obtained the chief complaint(s) and history of present illness.  I confirmed and edited as necessary the review of systems, past medical/surgical history, family history, social history, and examination findings as documented by others; and I examined the patient myself.  I personally reviewed the relevant tests, images, and reports as documented above.  I formulated and edited as necessary the assessment and plan and discussed the findings and management plan with the patient and family. Steph You MD

## 2024-11-04 NOTE — NURSING NOTE
Chief Complaints and History of Present Illnesses   Patient presents with    Central Serous Retinopathy Follow Up     Chief Complaint(s) and History of Present Illness(es)       Central Serous Retinopathy Follow Up              Laterality: right eye    Associated symptoms: floaters (left) and foreign body sensation (right).  Negative for dryness, eye pain, flashes, itching and burning    Pain scale: 0/10              Comments    Tino is here to continue care for right eye central serous retinopathy. Today he states right eye seems the same as last visit.    Tino Carrillo COT 12:26 PM November 4, 2024

## 2024-11-15 ENCOUNTER — TELEPHONE (OUTPATIENT)
Dept: OPHTHALMOLOGY | Facility: CLINIC | Age: 74
End: 2024-11-15
Payer: COMMERCIAL

## 2024-11-15 NOTE — TELEPHONE ENCOUNTER
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 Patient cooperation: Reliable  .    Right Eye  Reliability of the test: Good  . Findings normal/abnormal: Abnormal OCT  . Interpretation: Retinal disease  . Plan: Monitor  . Interval: Better  .    Left Eye  Reliability of the test: Good  . Findings normal/abnormal: Abnormal OCT  . Interpretation: Retinal disease  . Plan: Surgery  . Interval: Same  .    Notes  See note     This is what Tino is seeing - Plan:Surgery in the left eye     Tino would like this removed if possible- He does not plan on having surgery.     Thank you,     Dorys Hogan Communication Facilitator on 11/15/2024 at 1:34 PM      Dorys Hogan Communication Facilitator on 11/15/2024 at 1:34 PM

## 2024-11-15 NOTE — TELEPHONE ENCOUNTER
Called and update Tino it was fixed and corrected     Dorys Hogan Communication Facilitator on 11/15/2024 at 2:07 PM

## 2024-11-15 NOTE — TELEPHONE ENCOUNTER
[facility] Call Center    Phone Message    May a detailed message be left on voicemail: yes     Reason for Call: Other: Patient want to let us know that for a while now in our AVS notes we document that his left eye need surgery. Patient states his left eye is fine and does not need surgery. Patient would like a call back to discuss and possibly have it removed from notes.     Please call patient back at [phone]. Thank you.    Action Taken: Message routed to:  [facility] Eye    Travel Screening: Not Applicable

## 2025-01-19 ENCOUNTER — HEALTH MAINTENANCE LETTER (OUTPATIENT)
Age: 75
End: 2025-01-19

## 2025-01-28 DIAGNOSIS — H35.711 CENTRAL SEROUS CHORIORETINOPATHY OF RIGHT EYE: Primary | ICD-10-CM

## 2025-01-28 DIAGNOSIS — H47.233 OPTIC CUPPING OF BOTH EYES: ICD-10-CM

## 2025-02-13 ENCOUNTER — TELEPHONE (OUTPATIENT)
Dept: OPHTHALMOLOGY | Facility: CLINIC | Age: 75
End: 2025-02-13
Payer: COMMERCIAL

## 2025-02-13 NOTE — TELEPHONE ENCOUNTER
[facility] Call Center    Phone Message    May a detailed message be left on voicemail: yes     Reason for Call: Other: Lab from [facility] stating they need new lab orders faxed over to them that have the providers signature on it. They can't accept the orders from [place]. Please advise.     Action Taken: Other: eye    Travel Screening: Not Applicable

## 2025-02-13 NOTE — TELEPHONE ENCOUNTER
[facility] Call Center    Phone Message    May a detailed message be left on voicemail: yes     Reason for Call: Order(s): Other:   Reason for requested: Tino's PCP called and is needing the potasium lab order that is needing to be done before starting a new medication faxed over to them as that is where pt is wanting to get labs completed. Lab order can be faxed to [phone]. Please call Pt to let him know when order has been faxed to his PCP. Thank you.    Date needed: As soon as possible      Action Taken: Message routed to:  [facility] Eye    Travel Screening: Not Applicable     Date of Service:

## 2025-02-13 NOTE — TELEPHONE ENCOUNTER
Potassium lab order was printed and faxed successfully per the pt's request.    Pt was called and informed of this.    [place] COA 2:09 PM February 13, 2025

## 2025-04-08 DIAGNOSIS — H35.711 CENTRAL SEROUS CHORIORETINOPATHY OF RIGHT EYE: Primary | ICD-10-CM

## 2025-04-15 ENCOUNTER — OFFICE VISIT (OUTPATIENT)
Dept: OPHTHALMOLOGY | Facility: CLINIC | Age: 75
End: 2025-04-15
Attending: OPHTHALMOLOGY
Payer: COMMERCIAL

## 2025-04-15 DIAGNOSIS — H47.233 OPTIC CUPPING OF BOTH EYES: ICD-10-CM

## 2025-04-15 DIAGNOSIS — Z96.1 PSEUDOPHAKIA OF BOTH EYES: ICD-10-CM

## 2025-04-15 DIAGNOSIS — H35.711 CENTRAL SEROUS CHORIORETINOPATHY OF RIGHT EYE: Primary | ICD-10-CM

## 2025-04-15 PROCEDURE — 92134 CPTRZ OPH DX IMG PST SGM RTA: CPT | Performed by: OPHTHALMOLOGY

## 2025-04-15 PROCEDURE — G0463 HOSPITAL OUTPT CLINIC VISIT: HCPCS | Performed by: OPHTHALMOLOGY

## 2025-04-15 PROCEDURE — 92235 FLUORESCEIN ANGRPH MLTIFRAME: CPT | Performed by: OPHTHALMOLOGY

## 2025-04-15 ASSESSMENT — VISUAL ACUITY
OS_SC+: -3
OD_SC: 20/40
METHOD: SNELLEN - LINEAR
OD_SC+: -2
OS_SC: 20/25

## 2025-04-15 ASSESSMENT — TONOMETRY
OS_IOP_MMHG: 17
IOP_METHOD: ICARE
OD_IOP_MMHG: 17

## 2025-04-15 ASSESSMENT — SLIT LAMP EXAM - LIDS
COMMENTS: NORMAL
COMMENTS: NORMAL

## 2025-04-15 ASSESSMENT — EXTERNAL EXAM - LEFT EYE: OS_EXAM: NORMAL

## 2025-04-15 ASSESSMENT — EXTERNAL EXAM - RIGHT EYE: OD_EXAM: NORMAL

## 2025-04-15 NOTE — PROGRESS NOTES
CC: central serous chorioretinopathy right eye     Interval: Dr. Wilfred You patient; Eplernone dose increased to 50 mg daily last visit and used it until 2 w ago  No change in vision    HPI: Mr Hunt is seen today for follow up on central serous chorioretinopathy right eye.     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  Left eye: Good foveal contour, no IRF/SRF     FA 4/15/25  Right eye normal transit; window defect and staining at fovea but no leakage  Left eye within normal limits     OCT RNFL: 2/13/24  Right eye: no thinning  Left eye: no thinning     Retina Laser procedures:  none    Intravitreal injections:  none    Assessment/ Plan: 04/15/2025       # Central serous retinopathy right eye   -Fluorescein angiography/icg transits right eye done previously to rule out polypoidal choroidal vasculopathy shows mild staining, no choroidal neovascularization or polyps   - Previously tried eplerenone with minimal improvement; dose increased to 50 mg daily (stopped 2 w ago)    - 4/15/25: no noticeable change in SRF and RPE changes; double layer RPE elevation (SIRE) may be associated with occult CNVM: would recommend FA and possible avastin as test therapy to see if SIRE and SRF change vs PDT   - FA did not show CNVM: observe with no meds: I do not recommend PDT at this point because I do not think vision would improve significantly given RPE changes at fovea      # Optic disc cupping both eyes   - baseline RNFL shows no thinning both eyes     # Pseudophakia Both eyes   -stable     Return in about 3-4  month for , Exam & OCT macula with Dr. Wilfred You    Complete documentation of historical and exam elements from today's encounter can be found in the full encounter summary report (not reduplicated in this progress note). I personally obtained the chief complaint(s) and history of present illness.  I confirmed and edited as necessary the review of systems, past  medical/surgical history, family history, social history, and examination findings as documented by others; and I examined the patient myself. I personally reviewed the relevant tests, images, and reports as documented above. I formulated and edited as necessary the assessment and plan and discussed the findings and management plan with the patient and family.    Sammy Heaton MD, PhD

## 2025-04-15 NOTE — NURSING NOTE
Chief Complaints and History of Present Illnesses   Patient presents with    Central Serous Retinopathy Follow Up     Chief Complaint(s) and History of Present Illness(es)       Central Serous Retinopathy Follow Up               Comments    Patient states no noticeable changes since last visit. Pt stopped taking eplernone 25 mg a couple weeks ago and since then has noticed a possible decrease in va. No pain BE. Occasional dryness and irritation. No new flashes of light or floaters.    Ocular Meds:   eplernone 25 mg orally  Artificial tears as needed    Nayeli SHIRLEY 9:02 AM April 15, 2025

## 2025-08-11 ENCOUNTER — OFFICE VISIT (OUTPATIENT)
Dept: OPHTHALMOLOGY | Facility: CLINIC | Age: 75
End: 2025-08-11
Payer: COMMERCIAL

## 2025-08-11 DIAGNOSIS — H35.711 CENTRAL SEROUS CHORIORETINOPATHY OF RIGHT EYE: Primary | ICD-10-CM

## 2025-08-11 PROCEDURE — 92134 CPTRZ OPH DX IMG PST SGM RTA: CPT

## 2025-08-11 PROCEDURE — G0463 HOSPITAL OUTPT CLINIC VISIT: HCPCS

## 2025-08-11 PROCEDURE — 99213 OFFICE O/P EST LOW 20 MIN: CPT

## 2025-08-11 RX ORDER — TAMSULOSIN HYDROCHLORIDE 0.4 MG/1
0.4 CAPSULE ORAL DAILY
COMMUNITY

## 2025-08-11 RX ORDER — ATORVASTATIN CALCIUM 20 MG/1
TABLET, FILM COATED ORAL
COMMUNITY

## 2025-08-11 ASSESSMENT — VISUAL ACUITY
OD_SC: 20/40
OS_PH_SC+: -2/+1
OS_SC: 20/40
OS_PH_SC: 20/25
OD_SC+: -1/+1
OS_SC+: -2/+1
METHOD: SNELLEN - LINEAR

## 2025-08-11 ASSESSMENT — TONOMETRY
IOP_METHOD: TONOPEN
OD_IOP_MMHG: 23
OS_IOP_MMHG: 19

## 2025-08-11 ASSESSMENT — EXTERNAL EXAM - LEFT EYE: OS_EXAM: NORMAL

## 2025-08-11 ASSESSMENT — EXTERNAL EXAM - RIGHT EYE: OD_EXAM: NORMAL

## 2025-08-11 ASSESSMENT — SLIT LAMP EXAM - LIDS
COMMENTS: NORMAL
COMMENTS: NORMAL